# Patient Record
Sex: FEMALE | Race: OTHER | URBAN - METROPOLITAN AREA
[De-identification: names, ages, dates, MRNs, and addresses within clinical notes are randomized per-mention and may not be internally consistent; named-entity substitution may affect disease eponyms.]

---

## 2021-11-23 NOTE — PATIENT DISCUSSION
Chronic, becoming more severe recently. 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

## 2021-11-23 NOTE — PATIENT DISCUSSION
Optic nerves appear normal, with a borderline enlarged cup OU and visible spontaneous venous pulsations OU. Normal OCT of the optic nerve.

## 2021-11-23 NOTE — PATIENT DISCUSSION
Based upon the exam today, it is unlikely that the patient has elevated ICP. Recommend patient continue workup with her neurologist. Follow up with me PRN.

## 2023-04-10 ENCOUNTER — FOLLOW UP (OUTPATIENT)
Dept: URBAN - METROPOLITAN AREA CLINIC 20 | Facility: CLINIC | Age: 43
End: 2023-04-10

## 2023-04-10 DIAGNOSIS — H15.111: ICD-10-CM

## 2023-04-10 PROCEDURE — 92012 INTRM OPH EXAM EST PATIENT: CPT

## 2023-04-10 ASSESSMENT — VISUAL ACUITY
OS_SC: 20/30-2
OU_SC: 20/25-2
OD_SC: 20/25-1
OU_SC: J1+

## 2023-04-10 ASSESSMENT — TONOMETRY
OS_IOP_MMHG: 8
OD_IOP_MMHG: 8

## 2023-10-03 ENCOUNTER — FOLLOW UP (OUTPATIENT)
Dept: URBAN - METROPOLITAN AREA CLINIC 21 | Facility: CLINIC | Age: 43
End: 2023-10-03

## 2023-10-03 DIAGNOSIS — H15.111: ICD-10-CM

## 2023-10-03 PROCEDURE — 99213 OFFICE O/P EST LOW 20 MIN: CPT

## 2023-10-03 ASSESSMENT — VISUAL ACUITY
OS_SC: 20/25-1
OD_SC: 20/25-1

## 2023-10-03 ASSESSMENT — TONOMETRY
OS_IOP_MMHG: 17
OD_IOP_MMHG: 17